# Patient Record
Sex: FEMALE | Race: WHITE | NOT HISPANIC OR LATINO
[De-identification: names, ages, dates, MRNs, and addresses within clinical notes are randomized per-mention and may not be internally consistent; named-entity substitution may affect disease eponyms.]

---

## 2019-04-03 PROBLEM — Z00.00 ENCOUNTER FOR PREVENTIVE HEALTH EXAMINATION: Status: ACTIVE | Noted: 2019-04-03

## 2019-04-08 ENCOUNTER — APPOINTMENT (OUTPATIENT)
Dept: OTOLARYNGOLOGY | Facility: CLINIC | Age: 54
End: 2019-04-08
Payer: MEDICARE

## 2019-04-08 VITALS
SYSTOLIC BLOOD PRESSURE: 99 MMHG | BODY MASS INDEX: 31.82 KG/M2 | HEART RATE: 101 BPM | DIASTOLIC BLOOD PRESSURE: 62 MMHG | HEIGHT: 66 IN | WEIGHT: 198 LBS

## 2019-04-08 DIAGNOSIS — Z81.8 FAMILY HISTORY OF OTHER MENTAL AND BEHAVIORAL DISORDERS: ICD-10-CM

## 2019-04-08 DIAGNOSIS — Z80.6 FAMILY HISTORY OF LEUKEMIA: ICD-10-CM

## 2019-04-08 DIAGNOSIS — Z82.0 FAMILY HISTORY OF EPILEPSY AND OTHER DISEASES OF THE NERVOUS SYSTEM: ICD-10-CM

## 2019-04-08 DIAGNOSIS — Z86.69 PERSONAL HISTORY OF OTHER DISEASES OF THE NERVOUS SYSTEM AND SENSE ORGANS: ICD-10-CM

## 2019-04-08 DIAGNOSIS — J30.89 OTHER ALLERGIC RHINITIS: ICD-10-CM

## 2019-04-08 DIAGNOSIS — Z78.9 OTHER SPECIFIED HEALTH STATUS: ICD-10-CM

## 2019-04-08 DIAGNOSIS — Z87.11 PERSONAL HISTORY OF PEPTIC ULCER DISEASE: ICD-10-CM

## 2019-04-08 DIAGNOSIS — Z85.43 PERSONAL HISTORY OF MALIGNANT NEOPLASM OF OVARY: ICD-10-CM

## 2019-04-08 PROCEDURE — 31575 DIAGNOSTIC LARYNGOSCOPY: CPT

## 2019-04-08 PROCEDURE — 99203 OFFICE O/P NEW LOW 30 MIN: CPT | Mod: 25

## 2019-04-08 RX ORDER — SUCRALFATE 1 G/1
TABLET ORAL
Refills: 0 | Status: ACTIVE | COMMUNITY

## 2019-04-08 RX ORDER — TRAMADOL HYDROCHLORIDE 50 MG/1
50 TABLET, COATED ORAL
Refills: 0 | Status: ACTIVE | COMMUNITY

## 2019-04-08 RX ORDER — SUMATRIPTAN SUCCINATE 100 MG/1
TABLET, FILM COATED ORAL
Refills: 0 | Status: ACTIVE | COMMUNITY

## 2019-04-08 RX ORDER — OMEPRAZOLE 20 MG/1
20 CAPSULE, DELAYED RELEASE ORAL
Refills: 0 | Status: ACTIVE | COMMUNITY

## 2019-04-08 RX ORDER — ATOMOXETINE HYDROCHLORIDE 80 MG/1
80 CAPSULE ORAL
Refills: 0 | Status: ACTIVE | COMMUNITY

## 2019-04-08 RX ORDER — VENLAFAXINE HCL 50 MG
TABLET ORAL
Refills: 0 | Status: ACTIVE | COMMUNITY

## 2019-04-08 NOTE — CONSULT LETTER
[Dear  ___] : Dear  [unfilled], [Consult Letter:] : I had the pleasure of evaluating your patient, [unfilled]. [Please see my note below.] : Please see my note below. [Consult Closing:] : Thank you very much for allowing me to participate in the care of this patient.  If you have any questions, please do not hesitate to contact me. [Sincerely,] : Sincerely, [FreeTextEntry3] : Felecia Renee MD\par

## 2019-04-08 NOTE — REASON FOR VISIT
[Initial Evaluation] : an initial evaluation for [Gastroesophageal Reflux] : gastroesophageal reflux [FreeTextEntry2] : nasal irritation

## 2019-04-08 NOTE — ASSESSMENT
[FreeTextEntry1] : She has a history of chronic rhinitis and reflux. There were no suspicious masses or lesions on nasal endoscopy. She does have a deviated septum anteriorly. This likely becomes irritated when she has a URI or possible allergies. \par \par She is reflux. She is on medication for it. She had reflux related laryngeal changes on flexible laryngoscopy. \par \par PLAN\par \par -findings and management options discussed in detail with the patient. \par -humidifier prn\par -moisturizing nasal gel as needed. she may use the topical antibiotic ointment if she has crusting or irritation. I would also like to see her when she has the nasal symptoms\par -continue CPAP. \par -Reflux precautions and medication for reflux\par -GI follow up recommended. \par -follow up 4 weeks. \par -call and return earlier if any concerns. \par

## 2019-04-08 NOTE — HISTORY OF PRESENT ILLNESS
[de-identified] : Geovanna Webb is a very pleasant 54 year old patient here for evaluation for acid reflux and chronic rhinitis. She has a history of chronic rhinitis. She had nasal irritation on the left side last summer following an upper respiratory tract infection. She uses CPAP. She tried a nasal ointment which was likely a topical antibiotic. She was sick 3-4 weeks ago. she had a little bit of bleeding from her nose. It resolved. She had recurrent sore inside the left nostril. She used the ointment a couple times. It is better. She has no pain. She does have a history of reflux. She has occasional cough, heartburn, and globus sensation. She denies for throat pain, voice change, or bleeding. She has seen a gastroenterologist. She underwent extensive surgery for ovarian cancer. She takes omeprazole and Carafate.

## 2019-05-13 ENCOUNTER — APPOINTMENT (OUTPATIENT)
Dept: OTOLARYNGOLOGY | Facility: AMBULATORY SURGERY CENTER | Age: 54
End: 2019-05-13

## 2019-05-13 ENCOUNTER — APPOINTMENT (OUTPATIENT)
Dept: OTOLARYNGOLOGY | Facility: CLINIC | Age: 54
End: 2019-05-13
Payer: MEDICARE

## 2019-05-13 VITALS
SYSTOLIC BLOOD PRESSURE: 110 MMHG | WEIGHT: 198 LBS | HEART RATE: 95 BPM | DIASTOLIC BLOOD PRESSURE: 71 MMHG | HEIGHT: 66 IN | BODY MASS INDEX: 31.82 KG/M2

## 2019-05-13 DIAGNOSIS — J31.0 CHRONIC RHINITIS: ICD-10-CM

## 2019-05-13 DIAGNOSIS — K21.9 GASTRO-ESOPHAGEAL REFLUX DISEASE W/OUT ESOPHAGITIS: ICD-10-CM

## 2019-05-13 PROCEDURE — 99213 OFFICE O/P EST LOW 20 MIN: CPT

## 2019-05-13 NOTE — CONSULT LETTER
[Dear  ___] : Dear  [unfilled], [Courtesy Letter:] : I had the pleasure of seeing your patient, [unfilled], in my office today. [Consult Closing:] : Thank you very much for allowing me to participate in the care of this patient.  If you have any questions, please do not hesitate to contact me. [Please see my note below.] : Please see my note below. [Sincerely,] : Sincerely, [FreeTextEntry3] : Felecia Renee MD\par

## 2019-05-13 NOTE — HISTORY OF PRESENT ILLNESS
[de-identified] : 4/8/19- Geovanna Webb is a very pleasant 54 year old patient here for evaluation for acid reflux and chronic rhinitis. She has a history of chronic rhinitis. She had nasal irritation on the left side last summer following an upper respiratory tract infection. She uses CPAP. She tried a nasal ointment which was likely a topical antibiotic. She was sick 3-4 weeks ago. she had a little bit of bleeding from her nose. It resolved. She had recurrent sore inside the left nostril. She used the ointment a couple times. It is better. She has no pain. She does have a history of reflux. She has occasional cough, heartburn, and globus sensation. She denies for throat pain, voice change, or bleeding. She has seen a gastroenterologist. She underwent extensive surgery for ovarian cancer. She takes omeprazole and Carafate.\par \par NE/FL [FreeTextEntry1] : 5/13/19- Geovanna Webb is here for follow up.  She has chronic rhinitis and reflux.  She has a history of an intermittent sore in the left nasal cavity.  That has been better. SHe is using a moisturizing nasal gel as needed. She developed a URI since her last visit and was treated with a Z-pack. She had a cough.  She is improving. She has a postnasal drip. she has no sinus pain, pressure or throat pain. She is on medication for reflux. She has a GI appointment pending.

## 2019-05-13 NOTE — ASSESSMENT
[FreeTextEntry1] : She has a history of reflux and chronic rhinitis. She had a recent upper respiratory tract infection.  She is reflux. She is on medication for it. She has not had recurrent sore in the nasal cavity. There were no suspicious masses or lesions on exam\par \par PLAN\par \par -findings and management options discussed in detail with the patient. \par -humidifier and moisturizing nasal gel as needed\par -continue CPAP. \par -Reflux precautions and medication for reflux\par -GI follow up pending\par -allergy evaluation recommended\par -allergy medications as needed. \par -repeat NE/FL if she is not feeling better. IT was deferred today since she is improving after the infection.  \par -follow up after the allergy evaluation\par -call and return earlier if any concerns. \par